# Patient Record
(demographics unavailable — no encounter records)

---

## 2017-03-08 NOTE — NUR
31Y F BIB MOM C/O FLU LIKE SYMPTOMS, HA 10/10, SORE THROAT, GENERALIZED 
WEAKNESS X 1 DAY. PT STATES HE HAS A MEDICAL HX OF HYPOMANIC BIPOLAR AND IS 
CURRENTLY TAKING DEPAKOTE AND ZOLOFT ; SKIN IS PINK/WARM/DRY; AAOX4 WITH EVEN 
AND STEADY GAIT; LUNGS CLEAR BL; HR EVEN AND REGULAR; PT DENIES ANY FEVER, CP, 
SOB, OR COUGH AT THIS TIME; PATIENT STATES PAIN OF 10/10 AT THIS TIME; VSS; 
PATIENT POSITIONED FOR COMFORT; HOB ELEVATED; BEDRAILS UP X2; BED DOWN. ER MD 
MADE AWARE OF PT STATUS.

## 2017-03-08 NOTE — NUR
Patient discharged with v/s stable. Written and verbal after care instructions 
given and explained. Patient alert, oriented and verbalized understanding of 
instructions. Ambulatory with steady gait. All questions addressed prior to 
discharge. ID band removed. Patient advised to follow up with PMD. Rx of ZOFRAN 
ODT 4MG AND ULTRAM 50MG given. Patient educated on indication of medication 
including possible reaction and side effects. Opportunity to ask questions 
provided and answered.

## 2017-05-21 NOTE — NUR
Patient discharged with v/s stable. Written and verbal after care instructions 
given and explained. 

Patient alert, oriented and verbalized understanding of instructions. 
Ambulatory with steady gait. All questions addressed prior to discharge. ID 
band removed. Patient advised to follow up with PMD. Rx of DEXTROMETHORPHAN AND 
AZITHROMYCIN given. Patient educated on indication of medication including 
possible reaction and side effects. Opportunity to ask questions provided and 
answered.